# Patient Record
Sex: MALE | Race: WHITE | NOT HISPANIC OR LATINO
[De-identification: names, ages, dates, MRNs, and addresses within clinical notes are randomized per-mention and may not be internally consistent; named-entity substitution may affect disease eponyms.]

---

## 2023-07-13 ENCOUNTER — APPOINTMENT (OUTPATIENT)
Dept: VASCULAR SURGERY | Facility: CLINIC | Age: 53
End: 2023-07-13
Payer: COMMERCIAL

## 2023-07-13 ENCOUNTER — NON-APPOINTMENT (OUTPATIENT)
Age: 53
End: 2023-07-13

## 2023-07-13 VITALS — HEIGHT: 70 IN | WEIGHT: 198 LBS | BODY MASS INDEX: 28.35 KG/M2

## 2023-07-13 DIAGNOSIS — Z78.9 OTHER SPECIFIED HEALTH STATUS: ICD-10-CM

## 2023-07-13 PROBLEM — Z00.00 ENCOUNTER FOR PREVENTIVE HEALTH EXAMINATION: Status: ACTIVE | Noted: 2023-07-13

## 2023-07-13 PROCEDURE — 93971 EXTREMITY STUDY: CPT

## 2023-07-13 PROCEDURE — 99243 OFF/OP CNSLTJ NEW/EST LOW 30: CPT

## 2023-07-13 NOTE — CONSULT LETTER
[Dear  ___] : Dear  [unfilled], [Consult Letter:] : I had the pleasure of evaluating your patient, [unfilled]. [Please see my note below.] : Please see my note below. [Consult Closing:] : Thank you very much for allowing me to participate in the care of this patient.  If you have any questions, please do not hesitate to contact me. [Sincerely,] : Sincerely, [FreeTextEntry3] : Andrea Dai MD, RPVI

## 2023-07-13 NOTE — PROCEDURE
[FreeTextEntry1] : Left leg venous duplex performed\par No DVT or SVT\par Superficial reflux in the GSV

## 2023-07-13 NOTE — HISTORY OF PRESENT ILLNESS
[FreeTextEntry1] : 53-year-old male referred by Dr. Rodriguez for evaluation of symptomatic varicose veins of his left lower extremity.\par He reports that he has had varicose veins of his left leg for most of his adult life.  They have grown in size over the last couple years.  Over the last several months, he has developed pain and discomfort overlying the varicose veins associated with heaviness and aching of his leg.  The symptoms are a bit improved in the morning time and progressively worsening through the day.  He denies a prior history of DVT.  He does have a family history of varicose veins, his mother suffers from VV.

## 2023-07-13 NOTE — PHYSICAL EXAM
[Normal Breath Sounds] : Normal breath sounds [2+] : left 2+ [Varicose Veins Of Lower Extremities] : present [Varicose Veins Of The Left Leg] : of the left leg [Alert] : alert [Oriented to Person] : oriented to person [Oriented to Place] : oriented to place [Calm] : calm [de-identified] : NAD [de-identified] : NCAT [de-identified] : Supple [de-identified] : Soft, nontender, nondistended, no palpable masses [de-identified] : Bilateral legs with scattered reticular veins, left leg with varicose veins along calf, soft and compressible

## 2023-07-13 NOTE — ASSESSMENT
[FreeTextEntry1] : 53-year-old male with symptomatic varicose veins of his left lower extremity.  We discussed the natural history of venous disease, as well as importance of compression therapy.  I prescribed him grade 2 compression garments which she will obtain and wear on a daily basis.  We did obtain a venous duplex which was negative for DVT or SVT, and did reveal superficial reflux in the GSV.\par He will follow-up with me in 8 weeks for repeat evaluation, at which point we will reassess how his symptoms are progressed with the use of compression garments, and consider further treatment as indicated.

## 2023-08-24 ENCOUNTER — APPOINTMENT (OUTPATIENT)
Dept: VASCULAR SURGERY | Facility: CLINIC | Age: 53
End: 2023-08-24
Payer: COMMERCIAL

## 2023-08-24 VITALS — SYSTOLIC BLOOD PRESSURE: 117 MMHG | HEART RATE: 45 BPM | DIASTOLIC BLOOD PRESSURE: 73 MMHG

## 2023-08-24 DIAGNOSIS — I83.892 VARICOSE VEINS OF LEFT LOWER EXTREMITY WITH OTHER COMPLICATIONS: ICD-10-CM

## 2023-08-24 PROCEDURE — 99214 OFFICE O/P EST MOD 30 MIN: CPT

## 2023-08-24 NOTE — PHYSICAL EXAM
[Normal Breath Sounds] : Normal breath sounds [2+] : left 2+ [Varicose Veins Of Lower Extremities] : present [Varicose Veins Of The Left Leg] : of the left leg [Alert] : alert [Oriented to Person] : oriented to person [Oriented to Place] : oriented to place [Calm] : calm [de-identified] : NAD [de-identified] : NCAT [de-identified] : Supple [de-identified] : Soft, nontender, nondistended, no palpable masses [de-identified] : Bilateral legs with scattered reticular veins, left leg with varicose veins along calf, soft and compressible

## 2023-08-24 NOTE — ASSESSMENT
[FreeTextEntry1] : 53-year-old male with symptomatic varicose veins of his left lower extremity.  We discussed the natural history of venous disease, as well as importance of compression therapy.  I prescribed him grade 2 compression garments which she will obtain and wear on a daily basis.  We did obtain a venous duplex which was negative for DVT or SVT, and did reveal superficial reflux in the GSV. He will follow-up with me in 8 weeks for repeat evaluation, at which point we will reassess how his symptoms are progressed with the use of compression garments, and consider further treatment as indicated.  On follow up, patient's left lower extremity veins remain symptomatic despite wearing compression garments. He is an excellent candidate for an ablation of the left greater saphenous vein. Risks and benefits were discussed with patient including infection, bleeding, and DVT. The patient would like to proceed with the procedure and will be scheduled at his convenience.   Patient will continue to wear compression stockings until the procedure.

## 2023-08-24 NOTE — HISTORY OF PRESENT ILLNESS
[FreeTextEntry1] : 53-year-old male referred by Dr. Rodriguez for evaluation of symptomatic varicose veins of his left lower extremity.\par  He reports that he has had varicose veins of his left leg for most of his adult life.  They have grown in size over the last couple years.  Over the last several months, he has developed pain and discomfort overlying the varicose veins associated with heaviness and aching of his leg.  The symptoms are a bit improved in the morning time and progressively worsening through the day.  He denies a prior history of DVT.  He does have a family history of varicose veins, his mother suffers from VV. [de-identified] : 8/24/23 - Patient continues to have symptomatic left lower extremity varicose veins. He reports his symptoms worsen if he stands or walks for a prolonged period of time. His symptoms are slightly alleviated with the use of compression therapy. He presents to discuss additional treatment options.

## 2024-09-11 ENCOUNTER — NON-APPOINTMENT (OUTPATIENT)
Age: 54
End: 2024-09-11

## 2024-09-12 ENCOUNTER — APPOINTMENT (OUTPATIENT)
Dept: VASCULAR SURGERY | Facility: CLINIC | Age: 54
End: 2024-09-12

## 2024-09-12 VITALS — BODY MASS INDEX: 29.35 KG/M2 | WEIGHT: 205 LBS | HEIGHT: 70 IN

## 2024-09-12 DIAGNOSIS — I83.892 VARICOSE VEINS OF LEFT LOWER EXTREMITY WITH OTHER COMPLICATIONS: ICD-10-CM

## 2024-09-12 PROCEDURE — 99214 OFFICE O/P EST MOD 30 MIN: CPT

## 2024-09-12 NOTE — PHYSICAL EXAM
[Normal Breath Sounds] : Normal breath sounds [2+] : left 2+ [Varicose Veins Of Lower Extremities] : present [Varicose Veins Of The Left Leg] : of the left leg [Alert] : alert [Oriented to Person] : oriented to person [Oriented to Place] : oriented to place [Calm] : calm [de-identified] : NAD [de-identified] : NCAT [de-identified] : Supple [de-identified] : Soft, nontender, nondistended, no palpable masses [de-identified] : Bilateral legs with scattered reticular veins, left leg with varicose veins along calf, soft and compressible

## 2024-09-12 NOTE — ASSESSMENT
[FreeTextEntry1] : 54-year-old male with symptomatic varicose veins of his left lower extremity.  We discussed the natural history of venous disease, as well as importance of compression therapy.  I prescribed him grade 2 compression garments which she will obtain and wear on a daily basis.  We did obtain a venous duplex which was negative for DVT or SVT, and did reveal superficial reflux in the GSV. He will follow-up with me in 8 weeks for repeat evaluation, at which point we will reassess how his symptoms are progressed with the use of compression garments, and consider further treatment as indicated.  On follow up, patient's left lower extremity veins remain symptomatic despite wearing compression garments. He is an excellent candidate for an ablation of the left greater saphenous vein. Risks and benefits were discussed with patient including infection, bleeding, and DVT. The patient would like to proceed with the procedure and will be scheduled at his convenience.   Patient will continue to wear compression stockings until the procedure.  The patient returns for symptomatic varicose veins which worsen throughout the day. The patient was scheduled for an ablation of the left GSV since the last appointment, however, was lost to follow up.  I recommended the patient start a course of non-operative management with daily compression, elevation, and ambulation. The patient will undergo a left lower extremity venous duplex to evaluate for venous insufficiency. He will follow up in 6-8 weeks to discuss the results of the US and assess the efficacy of non-operative treatment.

## 2024-09-12 NOTE — END OF VISIT
[FreeTextEntry3] : All medical record entries made by the jean paulibe were at my, DILEEP COPE, direction and personally dictated by me on 9/12/2024. I have reviewed the chart and agree that the record accurately reflects my personal performance of the history, physical exam, assessment, and plan. I have also personally directed, reviewed, and agreed with the chart.

## 2024-09-12 NOTE — HISTORY OF PRESENT ILLNESS
[FreeTextEntry1] : 53-year-old male referred by Dr. Rodriguez for evaluation of symptomatic varicose veins of his left lower extremity. He reports that he has had varicose veins of his left leg for most of his adult life.  They have grown in size over the last couple years.  Over the last several months, he has developed pain and discomfort overlying the varicose veins associated with heaviness and aching of his leg.  The symptoms are a bit improved in the morning time and progressively worsening through the day.  He denies a prior history of DVT.  He does have a family history of varicose veins, his mother suffers from VV. [de-identified] : 8/24/23 - Patient continues to have symptomatic left lower extremity varicose veins. He reports his symptoms worsen if he stands or walks for a prolonged period of time. His symptoms are slightly alleviated with the use of compression therapy. He presents to discuss additional treatment options.  9/12/24 - 54 year old male returns for symptomatic left lower extremity varicose veins. The patient was scheduled for an ablation of the left GSV since the last appointment, however, he was lost to follow up. He reports over the past several weeks, he develops pain and discomfort overlying the varicose veins. He states the symptoms worsens when standing or walking for prolonged period of time. The patient does wear compression garments. He presents to discuss additional treatment options.

## 2024-10-01 ENCOUNTER — APPOINTMENT (OUTPATIENT)
Dept: VASCULAR SURGERY | Facility: CLINIC | Age: 54
End: 2024-10-01

## 2024-10-01 PROCEDURE — 93971 EXTREMITY STUDY: CPT | Mod: LT

## 2024-10-17 ENCOUNTER — APPOINTMENT (OUTPATIENT)
Dept: VASCULAR SURGERY | Facility: CLINIC | Age: 54
End: 2024-10-17

## 2024-10-17 VITALS — WEIGHT: 200 LBS | HEIGHT: 70 IN | BODY MASS INDEX: 28.63 KG/M2

## 2024-10-17 PROCEDURE — 99214 OFFICE O/P EST MOD 30 MIN: CPT
